# Patient Record
Sex: FEMALE | Race: WHITE | Employment: FULL TIME | URBAN - METROPOLITAN AREA
[De-identification: names, ages, dates, MRNs, and addresses within clinical notes are randomized per-mention and may not be internally consistent; named-entity substitution may affect disease eponyms.]

---

## 2021-02-24 ENCOUNTER — OFFICE VISIT (OUTPATIENT)
Dept: PRIMARY CARE CLINIC | Age: 26
End: 2021-02-24
Payer: COMMERCIAL

## 2021-02-24 VITALS
WEIGHT: 282 LBS | RESPIRATION RATE: 16 BRPM | DIASTOLIC BLOOD PRESSURE: 80 MMHG | OXYGEN SATURATION: 98 % | HEART RATE: 86 BPM | SYSTOLIC BLOOD PRESSURE: 132 MMHG | TEMPERATURE: 97.8 F | BODY MASS INDEX: 44.26 KG/M2 | HEIGHT: 67 IN

## 2021-02-24 DIAGNOSIS — F32.A ANXIETY AND DEPRESSION: Primary | ICD-10-CM

## 2021-02-24 DIAGNOSIS — Z00.00 PREVENTATIVE HEALTH CARE: ICD-10-CM

## 2021-02-24 DIAGNOSIS — Z01.419 ENCOUNTER FOR GYNECOLOGICAL EXAMINATION WITHOUT ABNORMAL FINDING: ICD-10-CM

## 2021-02-24 DIAGNOSIS — M54.42 CHRONIC LEFT-SIDED LOW BACK PAIN WITH LEFT-SIDED SCIATICA: ICD-10-CM

## 2021-02-24 DIAGNOSIS — G89.29 CHRONIC LEFT-SIDED LOW BACK PAIN WITH LEFT-SIDED SCIATICA: ICD-10-CM

## 2021-02-24 DIAGNOSIS — F41.9 ANXIETY AND DEPRESSION: Primary | ICD-10-CM

## 2021-02-24 PROCEDURE — 99203 OFFICE O/P NEW LOW 30 MIN: CPT | Performed by: FAMILY MEDICINE

## 2021-02-24 RX ORDER — MELOXICAM 7.5 MG/1
7.5 TABLET ORAL DAILY PRN
Qty: 30 TABLET | Refills: 1 | Status: SHIPPED
Start: 2021-02-24 | End: 2021-04-07

## 2021-02-24 ASSESSMENT — PATIENT HEALTH QUESTIONNAIRE - PHQ9
1. LITTLE INTEREST OR PLEASURE IN DOING THINGS: 3
7. TROUBLE CONCENTRATING ON THINGS, SUCH AS READING THE NEWSPAPER OR WATCHING TELEVISION: 3
SUM OF ALL RESPONSES TO PHQ9 QUESTIONS 1 & 2: 4
SUM OF ALL RESPONSES TO PHQ QUESTIONS 1-9: 15
6. FEELING BAD ABOUT YOURSELF - OR THAT YOU ARE A FAILURE OR HAVE LET YOURSELF OR YOUR FAMILY DOWN: 2
10. IF YOU CHECKED OFF ANY PROBLEMS, HOW DIFFICULT HAVE THESE PROBLEMS MADE IT FOR YOU TO DO YOUR WORK, TAKE CARE OF THINGS AT HOME, OR GET ALONG WITH OTHER PEOPLE: 2
3. TROUBLE FALLING OR STAYING ASLEEP: 1
8. MOVING OR SPEAKING SO SLOWLY THAT OTHER PEOPLE COULD HAVE NOTICED. OR THE OPPOSITE, BEING SO FIGETY OR RESTLESS THAT YOU HAVE BEEN MOVING AROUND A LOT MORE THAN USUAL: 1

## 2021-02-24 ASSESSMENT — ENCOUNTER SYMPTOMS
NAUSEA: 0
WHEEZING: 0
VOMITING: 0
SHORTNESS OF BREATH: 0

## 2021-02-24 ASSESSMENT — COLUMBIA-SUICIDE SEVERITY RATING SCALE - C-SSRS: 1. WITHIN THE PAST MONTH, HAVE YOU WISHED YOU WERE DEAD OR WISHED YOU COULD GO TO SLEEP AND NOT WAKE UP?: NO

## 2021-02-24 NOTE — PROGRESS NOTES
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 Respiratory: Negative for shortness of breath and wheezing. Cardiovascular: Negative for chest pain and palpitations. Gastrointestinal: Negative for nausea and vomiting. Genitourinary: Negative for dysuria, hematuria and urgency. Skin: Negative for rash. Neurological: Negative for dizziness and light-headedness. /80   Pulse 86   Temp 97.8 °F (36.6 °C)   Resp 16   Ht 5' 7\" (1.702 m)   Wt 282 lb (127.9 kg)   SpO2 98%   BMI 44.17 kg/m²   Physical Exam  Constitutional:       Appearance: Normal appearance. HENT:      Head: Normocephalic and atraumatic. Nose: Nose normal.      Mouth/Throat:      Mouth: Mucous membranes are moist.      Pharynx: No posterior oropharyngeal erythema. Eyes:      Extraocular Movements: Extraocular movements intact. Conjunctiva/sclera: Conjunctivae normal.   Cardiovascular:      Rate and Rhythm: Normal rate. Heart sounds: Normal heart sounds. Pulmonary:      Effort: Pulmonary effort is normal.      Breath sounds: Normal breath sounds. Skin:     General: Skin is warm. Neurological:      Mental Status: She is alert and oriented to person, place, and time. Psychiatric:         Attention and Perception: Attention normal.         Mood and Affect: Mood is anxious.          Speech: Speech normal. Behavior: Behavior normal.         Thought Content: Thought content normal.         Cognition and Memory: Cognition normal.         Judgment: Judgment normal.            Current Outpatient Medications:     sertraline (ZOLOFT) 50 MG tablet, Take 1 tablet by mouth daily, Disp: 30 tablet, Rfl: 1    meloxicam (MOBIC) 7.5 MG tablet, Take 1 tablet by mouth daily as needed for Pain, Disp: 30 tablet, Rfl: 1     No past medical history on file. Niles Gilmore was seen today for new patient. Diagnoses and all orders for this visit:    Anxiety and depression  -     sertraline (ZOLOFT) 50 MG tablet; Take 1 tablet by mouth daily    Encounter for gynecological examination without abnormal finding  -     ([facility]) - Puja Umaña MD, OB/GYN, [place] ([facility])    Preventative health care  -     CBC; Future  -     Comprehensive Metabolic Panel; Future  -     Lipid Panel; Future  -     TSH without Reflex; Future    Chronic left-sided low back pain with left-sided sciatica  -     meloxicam (MOBIC) 7.5 MG tablet;  Take 1 tablet by mouth daily as needed for Pain       Start Zoloft  Trial Mobic at bedtime  Labs soon  Referral to [address] [place] [address] 6 weeks    Lay Krishnan MD

## 2021-04-07 ENCOUNTER — OFFICE VISIT (OUTPATIENT)
Dept: PRIMARY CARE CLINIC | Age: 26
End: 2021-04-07
Payer: COMMERCIAL

## 2021-04-07 VITALS
RESPIRATION RATE: 18 BRPM | HEIGHT: 67 IN | DIASTOLIC BLOOD PRESSURE: 78 MMHG | SYSTOLIC BLOOD PRESSURE: 115 MMHG | TEMPERATURE: 98.7 F | BODY MASS INDEX: 39.39 KG/M2 | WEIGHT: 251 LBS | HEART RATE: 91 BPM | OXYGEN SATURATION: 98 %

## 2021-04-07 DIAGNOSIS — F41.9 ANXIETY AND DEPRESSION: ICD-10-CM

## 2021-04-07 DIAGNOSIS — F32.A ANXIETY AND DEPRESSION: ICD-10-CM

## 2021-04-07 DIAGNOSIS — G89.29 CHRONIC BILATERAL LOW BACK PAIN WITH LEFT-SIDED SCIATICA: Primary | ICD-10-CM

## 2021-04-07 DIAGNOSIS — M54.42 CHRONIC BILATERAL LOW BACK PAIN WITH LEFT-SIDED SCIATICA: Primary | ICD-10-CM

## 2021-04-07 PROCEDURE — 99214 OFFICE O/P EST MOD 30 MIN: CPT | Performed by: FAMILY MEDICINE

## 2021-04-07 RX ORDER — CITALOPRAM 20 MG/1
20 TABLET ORAL DAILY
Qty: 30 TABLET | Refills: 1 | Status: SHIPPED | OUTPATIENT
Start: 2021-04-07

## 2021-04-07 ASSESSMENT — ENCOUNTER SYMPTOMS
VOMITING: 0
SHORTNESS OF BREATH: 0
WHEEZING: 0
NAUSEA: 0

## 2021-04-07 NOTE — PROGRESS NOTES
[address] presents to the office today for   Chief Complaint   Patient presents with   Ade Morrison     For chronic anxiety/depression, states that she does not feel a diffrence with the zoloft at all. States that the maloxicam does not help her back either. She did not get her blood work done. Anxiety and Depression  Zoloft not helping  Not worse, just not better  No significant side effects  Previously on Lexapro and Effexor XR without effect  Getting  in early June  Honeymoon in [place]    Back pain  Woke her up out of sleep 10-14 days ago  Mobic did not help  Aggravated by doing laundry  Numbness in left leg and radiculopathy there  No imaging    Review of Systems   Constitutional: Negative for chills and fever. Respiratory: Negative for shortness of breath and wheezing. Cardiovascular: Negative for chest pain and palpitations. Gastrointestinal: Negative for nausea and vomiting. Genitourinary: Negative for dysuria, hematuria and urgency. Skin: Negative for rash. Neurological: Negative for dizziness and light-headedness. /78   Pulse 91   Temp 98.7 °F (37.1 °C)   Resp 18   Ht 5' 7\" (1.702 m)   Wt 251 lb (113.9 kg)   SpO2 98%   BMI 39.31 kg/m²   Physical Exam  Constitutional:       Appearance: Normal appearance. HENT:      Head: Normocephalic and atraumatic. Eyes:      Extraocular Movements: Extraocular movements intact. Conjunctiva/sclera: Conjunctivae normal.   Cardiovascular:      Rate and Rhythm: Normal rate. Heart sounds: Normal heart sounds. Pulmonary:      Effort: Pulmonary effort is normal.      Breath sounds: Normal breath sounds. Skin:     General: Skin is warm. Neurological:      Mental Status: She is alert and oriented to person, place, and time.    Psychiatric:         Mood and Affect: Mood normal.         Behavior: Behavior normal.            Current Outpatient Medications:     citalopram (CELEXA) 20 MG tablet, Take 1 tablet by mouth daily, Disp: 30 tablet, Rfl: 1     No past medical history on file. RUST was seen today for check-up. Diagnoses and all orders for this visit:    Chronic bilateral low back pain with left-sided sciatica  -     XR LUMBAR SPINE (2-3 VIEWS); Future    Anxiety and depression    Other orders  -     citalopram (CELEXA) 20 MG tablet;  Take 1 tablet by mouth daily       Switch to Celexa  Lumbar xray  Labs soon  Recheck 6 weeks    Dangelo Law MD

## 2021-05-13 ENCOUNTER — TELEPHONE (OUTPATIENT)
Dept: PRIMARY CARE CLINIC | Age: 26
End: 2021-05-13

## 2021-05-13 DIAGNOSIS — F32.A ANXIETY AND DEPRESSION: Primary | ICD-10-CM

## 2021-05-13 DIAGNOSIS — F41.9 ANXIETY AND DEPRESSION: Primary | ICD-10-CM

## 2021-05-13 RX ORDER — BUSPIRONE HYDROCHLORIDE 5 MG/1
5 TABLET ORAL 2 TIMES DAILY
Qty: 60 TABLET | Refills: 0 | Status: SHIPPED | OUTPATIENT
Start: 2021-05-13 | End: 2021-06-12

## 2021-05-13 NOTE — TELEPHONE ENCOUNTER
See if she wants to add Buspar 5 mg bid until appt next day  It should help fairly quickly, it does not take weeks like Celexa

## 2021-05-13 NOTE — TELEPHONE ENCOUNTER
Patient states that Celexa 20 mg not  Helping at all. She wants to know if you can change now or does she have to wait until appt. May 19. Pt is getting  June 5.